# Patient Record
Sex: FEMALE | Race: BLACK OR AFRICAN AMERICAN
[De-identification: names, ages, dates, MRNs, and addresses within clinical notes are randomized per-mention and may not be internally consistent; named-entity substitution may affect disease eponyms.]

---

## 2018-12-07 ENCOUNTER — HOSPITAL ENCOUNTER (OUTPATIENT)
Dept: HOSPITAL 74 - JASU-SURG | Age: 73
Discharge: HOME | End: 2018-12-07
Attending: UROLOGY
Payer: COMMERCIAL

## 2018-12-07 VITALS — TEMPERATURE: 98.3 F | HEART RATE: 62 BPM | DIASTOLIC BLOOD PRESSURE: 67 MMHG | SYSTOLIC BLOOD PRESSURE: 146 MMHG

## 2018-12-07 VITALS — BODY MASS INDEX: 36.3 KG/M2

## 2018-12-07 DIAGNOSIS — N20.0: Primary | ICD-10-CM

## 2018-12-07 PROCEDURE — 0T9780Z DRAINAGE OF LEFT URETER WITH DRAINAGE DEVICE, VIA NATURAL OR ARTIFICIAL OPENING ENDOSCOPIC: ICD-10-PCS | Performed by: UROLOGY

## 2018-12-07 NOTE — OP
Operative Note





- Note:


Operative Date: 12/07/18


Pre-Operative Diagnosis: L renal calculus


Operation: L ureteroscopy and JJ stent insertion


Findings: 





passed L renal calculus


Post-Operative Diagnosis: Same as Pre-op


Surgeon: Patrick Mesa


Anesthesiologist/CRNA: Cristofer Barrett


Anesthesia: General


Estimated Blood Loss (mls): 0


Drains & Tubes with Location: 6 fr 24 cm L JJ


Operative Report Dictated: Yes

## 2018-12-08 NOTE — OP
DATE OF OPERATION:  12/07/2018

 

PREOPERATIVE DIAGNOSIS:  Left renal calculus.

 

POSTOPERATIVE DIAGNOSIS:  Left renal calculus (passed).  

 

PROCEDURE:  Cystoscopy, left ureteroscopy, left double J stent insertion.  

 

SURGEON:  Patrick Solitario MD 

 

ASSISTANT:  None.

 

ANESTHESIA:  General via laryngeal mask. 

 

ANESTHESIOLOGIST:  _____

 

SPECIMENS:  None.  

 

CULTURES:  None.

 

DRAINS:  A 6-Nepalese 24-cm left double J stent. 

 

ESTIMATED BLOOD LOSS:  None.

 

COMPLICATIONS:  None.

 

DESCRIPTION OF PROCEDURE:  The patient was brought into the operating room and placed

on the operating room table in supine position.  After administration of general

anesthesia, the laryngeal mask IV antibiotics were administered.  Sequential

compression devices were placed, and the patient was placed in dorsal lithotomy

position.  The vagina and perineum were prepped and draped in the usual sterile

manner.  A 22-Nepalese cystoscope was inserted into the bladder with the obturator in

place.  The obturator was removed.  Urine was evacuated.  A 30-degree telescope was

inserted.  Cystoscopy was performed.  This demonstrated no foreign bodies, tumors,

stones, or inflammation.  Both ureteral orifices were in their usual location with

clear efflux bilaterally.  There appeared to be a cystocele.  The left ureteral

orifice was cannulated with a 0.038 guidewire, which was advanced to the level of the

left renal pelvis under fluoroscopic and direct visual guidance.  The dual lumen

catheter was inserted.  A second guidewire was passed after retrograde pyelogram was

done demonstrating possible filling defects in the lower pole of the kidney.  There

was no hydronephrosis.  The cystoscope was removed and sent, and a flexible

ureteroscope was inserted over the guidewire in a monorail fashion to the level of

the left renal pelvis under fluoroscopic guidance.  Guidewire was done, and

pyeloscopy was done.  The entire renal collecting system was inspected.  There were

no stones.  No hydronephrosis, no tumors.  She does have a history of angiomyolipoma.

 The entire course of the ureter was now inspected.  No stones were seen.  Retrograde

pyelogram was done.  Opacification of the collecting system demonstrated no

extravasation and no filling defects.  The cystoscope was backloaded after removing

ureteroscope, and a 6-Nepalese 24-cm left double-J stent was inserted over the

guidewire under direct visual and fluoroscopic guidance leaving 1 coil in the renal

pelvis and 1 coil in the bladder.  The bladder was emptied.  Instruments were

removed.  She tolerated the procedure well and transferred to the recovery room in

stable condition.  A stent was secured to the thigh with a suture and a Tegaderm. 

She will have this removed in the office on Monday.  

 

 

 

PATRICK SOLITARIO M.D.

 

[id number]

DD: 12/07/2018 12:53

DT: 12/08/2018 08:00

Job #:  [id number]

## 2020-07-08 PROBLEM — Z00.00 ENCOUNTER FOR PREVENTIVE HEALTH EXAMINATION: Status: ACTIVE | Noted: 2020-07-08

## 2020-07-10 ENCOUNTER — APPOINTMENT (OUTPATIENT)
Dept: NEPHROLOGY | Facility: CLINIC | Age: 75
End: 2020-07-10
Payer: MEDICARE

## 2020-07-10 ENCOUNTER — RESULT REVIEW (OUTPATIENT)
Age: 75
End: 2020-07-10

## 2020-07-10 VITALS
HEIGHT: 66 IN | DIASTOLIC BLOOD PRESSURE: 58 MMHG | OXYGEN SATURATION: 98 % | BODY MASS INDEX: 41.46 KG/M2 | WEIGHT: 258 LBS | HEART RATE: 54 BPM | TEMPERATURE: 98.3 F | SYSTOLIC BLOOD PRESSURE: 122 MMHG

## 2020-07-10 DIAGNOSIS — I10 ESSENTIAL (PRIMARY) HYPERTENSION: ICD-10-CM

## 2020-07-10 DIAGNOSIS — Z79.1 LONG TERM (CURRENT) USE OF NON-STEROIDAL ANTI-INFLAMMATORIES (NSAID): ICD-10-CM

## 2020-07-10 DIAGNOSIS — E11.9 TYPE 2 DIABETES MELLITUS W/OUT COMPLICATIONS: ICD-10-CM

## 2020-07-10 DIAGNOSIS — G89.29 UNSPECIFIED ABDOMINAL PAIN: ICD-10-CM

## 2020-07-10 DIAGNOSIS — E66.01 MORBID (SEVERE) OBESITY DUE TO EXCESS CALORIES: ICD-10-CM

## 2020-07-10 DIAGNOSIS — Z87.442 PERSONAL HISTORY OF URINARY CALCULI: ICD-10-CM

## 2020-07-10 DIAGNOSIS — R10.9 UNSPECIFIED ABDOMINAL PAIN: ICD-10-CM

## 2020-07-10 PROCEDURE — 99204 OFFICE O/P NEW MOD 45 MIN: CPT | Mod: 25

## 2020-07-10 PROCEDURE — 36415 COLL VENOUS BLD VENIPUNCTURE: CPT

## 2020-07-10 RX ORDER — IRON POLYSACCHARIDE COMPLEX 150 MG
150 CAPSULE ORAL DAILY
Refills: 0 | Status: ACTIVE | COMMUNITY

## 2020-07-10 NOTE — PHYSICAL EXAM
[General Appearance - Alert] : alert [General Appearance - In No Acute Distress] : in no acute distress [Outer Ear] : the ears and nose were normal in appearance [Extraocular Movements] : extraocular movements were intact [Hearing Threshold Finger Rub Not [place]] : hearing was normal [Sclera] : the sclera and conjunctiva were normal [Neck Cervical Mass (___cm)] : no neck mass was observed [Neck Appearance] : the appearance of the neck was normal [Apical Impulse] : the apical impulse was normal [Heart Sounds] : normal S1 and S2 [Exaggerated Use Of Accessory Muscles For Inspiration] : no accessory muscle use [Veins - Varicosity Changes] : there were no varicosital changes [Edema] : there was no peripheral edema [Bowel Sounds] : normal bowel sounds [No CVA Tenderness] : no ~M costovertebral angle tenderness [Abdomen Soft] : soft [Musculoskeletal - Swelling] : no joint swelling seen [No Spinal Tenderness] : no spinal tenderness [Skin Color & Pigmentation] : normal skin color and pigmentation [] : no rash [Affect] : the affect was normal [Oriented To Time, Place, And Person] : oriented to person, place, and time [No Focal Deficits] : no focal deficits [Mood] : the mood was normal [FreeTextEntry1] : uses a cane

## 2020-07-10 NOTE — HISTORY OF PRESENT ILLNESS
[FreeTextEntry1] : 73 yo AA female self referred - w/CC  of joellen flank pain, non radiating, no fevers - \par Has hx DM x ~ 10 yrs\par Has hx HTN\par Is taking allopurinol - but pt denies hx of gout\par Is taking PPI - occasional GERD\par \par Takes alleve a few times/week\par \par PMH of rheumatic fever\par \par FHx of CKD\par \par No tobacco\par No EtOH\par \par No children\par \par Retired, worked in admission\par \par PMH of rheumatic fever

## 2020-07-10 NOTE — ASSESSMENT
[FreeTextEntry1] : 73 yo aa female with hx of DM, HTN, nephrolithiasis, NSAIDs, hyperuricemia and back pain - no records available\par \par - check bmet, cbc, uptn, ua\par - advise weightloss exercise/pool therapy\par - check renal US\par -check uric acid level\par -hyperuicemia may be 2/2 HCTZ, if so may be able to stop and d/c allopurinol\par given hx of nephrolithiasis may need 24 hour stone study\par - counseled on avoiding NSAIDs ( takes 2-3x/week)\par - check PTH, litholink for hx of stones\par

## 2020-07-17 DIAGNOSIS — R39.9 UNSPECIFIED SYMPTOMS AND SIGNS INVOLVING THE GENITOURINARY SYSTEM: ICD-10-CM

## 2020-07-17 RX ORDER — AMOXICILLIN AND CLAVULANATE POTASSIUM 875; 125 MG/1; MG/1
875-125 TABLET, COATED ORAL TWICE DAILY
Qty: 14 | Refills: 0 | Status: ACTIVE | COMMUNITY
Start: 2020-07-17 | End: 1900-01-01

## 2020-10-14 ENCOUNTER — APPOINTMENT (OUTPATIENT)
Dept: NEPHROLOGY | Facility: CLINIC | Age: 75
End: 2020-10-14

## 2020-11-04 ENCOUNTER — APPOINTMENT (OUTPATIENT)
Dept: NEPHROLOGY | Facility: CLINIC | Age: 75
End: 2020-11-04

## 2020-11-05 ENCOUNTER — APPOINTMENT (OUTPATIENT)
Dept: NEPHROLOGY | Facility: CLINIC | Age: 75
End: 2020-11-05

## 2024-06-07 ENCOUNTER — TRANSCRIPTION ENCOUNTER (OUTPATIENT)
Age: 79
End: 2024-06-07

## 2024-08-30 ENCOUNTER — TRANSCRIPTION ENCOUNTER (OUTPATIENT)
Age: 79
End: 2024-08-30